# Patient Record
Sex: FEMALE | Race: BLACK OR AFRICAN AMERICAN | URBAN - NONMETROPOLITAN AREA
[De-identification: names, ages, dates, MRNs, and addresses within clinical notes are randomized per-mention and may not be internally consistent; named-entity substitution may affect disease eponyms.]

---

## 2019-03-26 ENCOUNTER — IMPORTED ENCOUNTER (OUTPATIENT)
Dept: URBAN - NONMETROPOLITAN AREA CLINIC 1 | Facility: CLINIC | Age: 44
End: 2019-03-26

## 2019-03-26 PROBLEM — H47.11: Noted: 2019-03-26

## 2019-03-26 PROBLEM — H52.13: Noted: 2019-03-26

## 2019-03-26 PROBLEM — H52.223: Noted: 2019-03-26

## 2019-03-26 PROCEDURE — 92340 FIT SPECTACLES MONOFOCAL: CPT

## 2019-03-26 PROCEDURE — S0621 ROUTINE OPHTHALMOLOGICAL EXA: HCPCS

## 2019-03-26 NOTE — PATIENT DISCUSSION
Myopia/astig-  Discussed refractive status in detail w/ pt today-  MR done today new GLRx given-  Monitor yearly or PRN Idopathic Intracranial Hypertension-  Discussed findings in detail w/ pt -  Signs/symptoms associated discussed-  Stable on exam today without changes noted. -  Patient was first diagnosed back in February 2006 by Dr. Cristal Morrell in which patient was referred to a neurologist. -  Patient states at this time she is not on any medication for this nor is she followd by a neurologist at this time. -  No edema noted on exam today. -  Stressed importance of yearly follow up/monitoring for recurrence pt expressed understanding. -  Will continue to monitor closely.

## 2020-08-11 ENCOUNTER — IMPORTED ENCOUNTER (OUTPATIENT)
Dept: URBAN - NONMETROPOLITAN AREA CLINIC 1 | Facility: CLINIC | Age: 45
End: 2020-08-11

## 2020-08-11 PROBLEM — H35.033: Noted: 2020-08-11

## 2020-08-11 PROBLEM — H52.13: Noted: 2020-08-11

## 2020-08-11 PROBLEM — H52.223: Noted: 2020-08-11

## 2020-08-11 PROCEDURE — 92014 COMPRE OPH EXAM EST PT 1/>: CPT

## 2020-08-11 PROCEDURE — 92015 DETERMINE REFRACTIVE STATE: CPT

## 2020-08-11 PROCEDURE — 92250 FUNDUS PHOTOGRAPHY W/I&R: CPT

## 2020-08-11 NOTE — PATIENT DISCUSSION
Myopia/astig-  Discussed refractive status in detail w/ pt today-  MR done today new GLRx given-  Monitor yearly or PRN Hypertensive Retinopathy very mild-  Discussed findings w/ pt today-  Stable on exam today-  Continue to monitor PRN Hx of Idopathic Intracranial Hypertension-  Discussed findings in detail w/ pt -  Signs/symptoms associated discussed-  Stable on exam today without changes noted. -  Optos done today @n/c no edema with good borders noted-  Patient was first diagnosed back in February 2006 by Dr. Cristal Morrell in which patient was referred to a neurologist. -  Patient states at this time she is not on any medication for this nor is she followd by a neurologist at this time. -  No edema noted on exam today.  -  Stressed importance of yearly follow up/monitoring for recurrence pt expressed understanding. -  Will continue to monitor closely.; 's Notes: MR  8/11/20DFE  8/11/20Optos  8/11/20

## 2022-04-09 ASSESSMENT — VISUAL ACUITY
OS_SC: 20/25-
OD_SC: 20/25-
OD_CC: 20/29-
OS_CC: 20/25-

## 2022-04-09 ASSESSMENT — TONOMETRY
OS_IOP_MMHG: 12
OD_IOP_MMHG: 12
OS_IOP_MMHG: 14
OD_IOP_MMHG: 14